# Patient Record
Sex: FEMALE | Race: WHITE | NOT HISPANIC OR LATINO | Employment: OTHER | URBAN - METROPOLITAN AREA
[De-identification: names, ages, dates, MRNs, and addresses within clinical notes are randomized per-mention and may not be internally consistent; named-entity substitution may affect disease eponyms.]

---

## 2021-11-06 ENCOUNTER — HOSPITAL ENCOUNTER (EMERGENCY)
Facility: HOSPITAL | Age: 76
Discharge: HOME | End: 2021-11-06
Attending: EMERGENCY MEDICINE | Admitting: EMERGENCY MEDICINE
Payer: MEDICARE

## 2021-11-06 VITALS
WEIGHT: 183 LBS | OXYGEN SATURATION: 98 % | TEMPERATURE: 97.4 F | DIASTOLIC BLOOD PRESSURE: 67 MMHG | RESPIRATION RATE: 20 BRPM | BODY MASS INDEX: 33.68 KG/M2 | HEIGHT: 62 IN | HEART RATE: 92 BPM | SYSTOLIC BLOOD PRESSURE: 122 MMHG
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
## 2021-11-06 DIAGNOSIS — L03.90 CELLULITIS: ICD-10-CM

## 2021-11-06 PROCEDURE — 99283 EMERGENCY DEPT VISIT LOW MDM: CPT

## 2021-11-06 PROCEDURE — 86618 LYME DISEASE ANTIBODY: CPT | Performed by: EMERGENCY MEDICINE

## 2021-11-06 PROCEDURE — 99283 EMERGENCY DEPT VISIT LOW MDM: CPT | Performed by: EMERGENCY MEDICINE

## 2021-11-06 PROCEDURE — 36415 COLL VENOUS BLD VENIPUNCTURE: CPT | Performed by: EMERGENCY MEDICINE

## 2021-11-06 RX ORDER — GINSENG 100 MG
1 CAPSULE ORAL ONCE
Status: COMPLETED | OUTPATIENT
Start: 2021-11-06 | End: 2021-11-06

## 2021-11-06 RX ORDER — DOXYCYCLINE HYCLATE 100 MG/1
100 CAPSULE ORAL 2 TIMES DAILY
Qty: 14 CAPSULE | Refills: 0 | Status: SHIPPED | OUTPATIENT
Start: 2021-11-06 | End: 2021-11-13

## 2021-11-06 RX ORDER — DOXYCYCLINE HYCLATE 100 MG/1
100 CAPSULE ORAL ONCE
Status: COMPLETED | OUTPATIENT
Start: 2021-11-06 | End: 2021-11-06

## 2021-11-06 RX ADMIN — BACITRACIN 1 SMALL APPLICATION: 500 OINTMENT TOPICAL at 23:30

## 2021-11-06 RX ADMIN — DOXYCYCLINE 100 MG: 100 CAPSULE ORAL at 23:29

## 2021-11-10 LAB — B BURGDOR IGG+IGM SER-ACNC: 29

## 2023-02-15 ENCOUNTER — OFFICE VISIT (OUTPATIENT)
Dept: OBGYN CLINIC | Facility: MEDICAL CENTER | Age: 78
End: 2023-02-15

## 2023-02-15 VITALS
SYSTOLIC BLOOD PRESSURE: 107 MMHG | HEART RATE: 88 BPM | WEIGHT: 189 LBS | HEIGHT: 62 IN | BODY MASS INDEX: 34.78 KG/M2 | DIASTOLIC BLOOD PRESSURE: 72 MMHG

## 2023-02-15 DIAGNOSIS — S46.912A STRAIN OF LEFT SHOULDER, INITIAL ENCOUNTER: Primary | ICD-10-CM

## 2023-02-15 RX ORDER — LOSARTAN POTASSIUM AND HYDROCHLOROTHIAZIDE 12.5; 5 MG/1; MG/1
1 TABLET ORAL DAILY
COMMUNITY

## 2023-02-15 RX ORDER — ATORVASTATIN CALCIUM 40 MG/1
40 TABLET, FILM COATED ORAL DAILY
COMMUNITY

## 2023-02-15 NOTE — PROGRESS NOTES
Ortho Sports Medicine Shoulder Visit     Assesment:   left shoulder RC strain    Plan:    Conservative treatment:    Ice to shoulder 1-2 times daily, for 20 minutes at a time  PT for ROM and strengthening to shoulder, rotator cuff, scapular stabilizers  Imaging: All imaging from today was reviewed by myself and explained to the patient  Injection:    No Injection planned at this time  Surgery:     No surgery is recommended at this point, continue with conservative treatment plan as noted  History of Present Illness: The patient is a 69 y o  referred to me by their primary care physician, seen in clinic for consultation of left shoulder pain  The patient denies a history of diabetes  The patient denies a history of thyroid disorder  Pain is located anterior, posterior, lateral   The patient rates the pain as a 5/10  The pain has been present for a few months     The mechanism of injury was unknown  The pain is characterized as dull, achy  The pain is present daily  Pain is improved by rest   Pain is aggravated by overhead activity  Symptoms include clicking  The patient denies weakness  The patient denies numbness and tingling  The patient has tried rest, ice and NSAIDS  Shoulder Surgical History:  None    Past Medical, Social and Family History:  Past Medical History:   Diagnosis Date   • High cholesterol    • Hypertension      History reviewed  No pertinent surgical history    Allergies   Allergen Reactions   • Bee Venom Anaphylaxis     Current Outpatient Medications on File Prior to Visit   Medication Sig Dispense Refill   • atorvastatin (LIPITOR) 40 mg tablet Take 40 mg by mouth daily     • Calcium Carbonate (CALCIUM 600 PO) Take by mouth     • Cetirizine HCl (ZYRTEC ALLERGY PO) Take by mouth     • losartan-hydrochlorothiazide (HYZAAR) 50-12 5 mg per tablet Take 1 tablet by mouth daily     • Multiple Vitamins-Minerals (CENTURY PO) Take by mouth No current facility-administered medications on file prior to visit  Social History     Socioeconomic History   • Marital status:      Spouse name: Not on file   • Number of children: Not on file   • Years of education: Not on file   • Highest education level: Not on file   Occupational History   • Not on file   Tobacco Use   • Smoking status: Every Day     Packs/day: 0 25     Types: Cigarettes   • Smokeless tobacco: Not on file   Vaping Use   • Vaping Use: Never used   Substance and Sexual Activity   • Alcohol use: Yes     Comment: social   • Drug use: Never   • Sexual activity: Not on file   Other Topics Concern   • Not on file   Social History Narrative   • Not on file     Social Determinants of Health     Financial Resource Strain: Not on file   Food Insecurity: Not on file   Transportation Needs: Not on file   Physical Activity: Not on file   Stress: Not on file   Social Connections: Not on file   Intimate Partner Violence: Not on file   Housing Stability: Not on file         I have reviewed the past medical, surgical, social and family history, medications and allergies as documented in the EMR  Review of systems: ROS is negative other than that noted in the HPI  Constitutional: Negative for fatigue and fever  HENT: Negative for sore throat  Respiratory: Negative for shortness of breath  Cardiovascular: Negative for chest pain  Gastrointestinal: Negative for abdominal pain  Endocrine: Negative for cold intolerance and heat intolerance  Genitourinary: Negative for flank pain  Musculoskeletal: Negative for back pain  Skin: Negative for rash  Allergic/Immunologic: Negative for immunocompromised state  Neurological: Negative for dizziness  Psychiatric/Behavioral: Negative for agitation  Physical Exam:    Blood pressure 107/72, pulse 88, height 5' 2" (1 575 m), weight 85 7 kg (189 lb)      General/Constitutional: NAD, well developed, well nourished  HENT: Normocephalic, atraumatic  CV: Intact distal pulses, regular rate  Resp: No respiratory distress or labored breathing  Lymphatic: No lymphadenopathy palpated  Neuro: Alert and Oriented x 3, no focal deficits  Psych: Normal mood, normal affect, normal judgement, normal behavior  Skin: Warm, dry, no rashes, no erythema     Shoulder Exam (focused): Shoulder focused exam:       RIGHT LEFT    Scapula Atrophy Negative Negative     Winging Negative Negative     Protraction Negative Negative    Rotator cuff SS 5/5 5/5     IS 5/5 5/5     SubS 5/5 5/5    ROM  170     ER0 60 60     ER90 90 90     IR90 40 40     IRb T6 T6    TTP: AC Negative Negative     Biceps Negative Negative     Coracoid Negative Negative    Special Tests: O'Briens Negative Negative     Nash-shear Negative Negative     Cross body Adduction Negative Negative     Speeds  Negative Negative     Ana's Negative Negative     Whipple Negative Negative       Neer Negative Negative     Dubois Negative Negative    Instability: Apprehension & relocation not tested not tested     Load & shift not tested not tested    Other: Crank Negative Negative               UE NV Exam: +2 Radial pulses bilaterally  Sensation intact to light touch C5-T1 bilaterally, Radial/median/ulnar nerve motor intact      Bilateral elbow, wrist, and and forearm ROM full, painless with passive ROM, no ttp or crepitance throughout extremities below shoulder joint    Cervical ROM is full without pain, numbness or tingling      Shoulder Imaging  MRI of the left shoulder were reviewed, which demonstrate small interstitial tearing of rotator cuff but no full-thickness tear  I have reviewed the radiology report and agree with their impression

## 2023-02-22 ENCOUNTER — TELEPHONE (OUTPATIENT)
Dept: OBGYN CLINIC | Facility: HOSPITAL | Age: 78
End: 2023-02-22

## 2023-02-22 NOTE — TELEPHONE ENCOUNTER
Caller: Wilder Philip - FRIEDA    Doctor/Office: beatrice    CB#: [phone]      What needs to be faxed: PT script    ATTN to: Wilder Philip    Fax#: [phone]      Documents were successfully e-faxed

## 2024-02-22 ENCOUNTER — HOSPITAL ENCOUNTER (OUTPATIENT)
Dept: MRI IMAGING | Facility: HOSPITAL | Age: 79
End: 2024-02-22
Payer: MEDICARE

## 2024-02-22 DIAGNOSIS — M25.561 PAIN IN RIGHT KNEE: ICD-10-CM

## 2024-02-22 PROCEDURE — 73721 MRI JNT OF LWR EXTRE W/O DYE: CPT

## 2024-02-22 PROCEDURE — G1004 CDSM NDSC: HCPCS

## 2025-03-03 LAB — HBA1C MFR BLD HPLC: 6.4 %

## 2025-06-19 ENCOUNTER — DOCUMENTATION (OUTPATIENT)
Dept: HEMATOLOGY ONCOLOGY | Facility: CLINIC | Age: 80
End: 2025-06-19

## 2025-06-19 ENCOUNTER — PATIENT OUTREACH (OUTPATIENT)
Dept: HEMATOLOGY ONCOLOGY | Facility: CLINIC | Age: 80
End: 2025-06-19

## 2025-06-19 ENCOUNTER — TELEPHONE (OUTPATIENT)
Age: 80
End: 2025-06-19

## 2025-06-19 NOTE — TELEPHONE ENCOUNTER
Call received by Vivi from  office- identifiers were provided.     Vivi called requesting to speak to care coordinator prior to placing referral. Vivi states patient had CT scan completed and wants to make sure patient is being referred to the correct department. Provider unsure if patient should be seen by Pulmonary or Hematology Oncology. Vivi is the care coordinator for  and is requesting a call back to further discuss.     Please call Vivi at [phone]

## 2025-06-19 NOTE — PROGRESS NOTES
Received the following inDerivixet message:    Call received by Vivi from  office- identifiers were provided.      Vivi called requesting to speak to care coordinator prior to placing referral. Vivi states patient had CT scan completed and wants to make sure patient is being referred to the correct department. Provider unsure if patient should be seen by Pulmonary or Hematology Oncology. Vivi is the care coordinator for  and is requesting a call back to further discuss.      Please call Vivi at [phone].    Called and spoke to Vivi.  Patient had CT chest on 6/18/25 at [facility].    IMPRESSION:  Complete left upper lobe atelectasis with obstruction of the proximal left upper lobe bronchus.  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 Trace left pleural effusion is also seen.     I recommended Vivi have Dr. Dixon place a referral to Dr. Oconnor in Thoracic Surgery and also informed her I would make the [facility] aware of the patient.  I provided my direct contact information for further questions or concerns.

## 2025-06-19 NOTE — PROGRESS NOTES
Message sent to Thoracic Surgery to retrieve records    [] Email sent to pt with Authorization for Release of Health Information  Pt referred to Thoracic Surgery and should be scheduled within  days.  Please notify me if not scheduled within time frame.    Referral received/ Chart reviewed for work up completed     Imaging completed:    [] PET/CT   [] MRI   [x] CT chest 06/18/25  [facility]   [] US   [] Mammo   [] Bone scan   [] N/A    Pathology completed:    Date:   Location:   []N/A    Additional records needed:    [] Genomic report   [] Genetic testing results   [] Office Note   [] Procedure/ Operative note   [] Lab results   [] N/A      [] Radiation Oncology records retrieval needed (PN to route to rad/onc clerical pool once scheduled)  Date:  Location:    Hx of skin cancer - excised   No hx of chemo or RT

## 2025-06-20 ENCOUNTER — PATIENT OUTREACH (OUTPATIENT)
Dept: HEMATOLOGY ONCOLOGY | Facility: CLINIC | Age: 80
End: 2025-06-20

## 2025-06-20 NOTE — PROGRESS NOTES
Outreach to patient regarding referral placed by PCP. Advised we will be getting her scheduled with Thoracic Surgery regarding CT chest. We will be sending her to [facility] on Wednesday 06/25/25 at 12.  If that changes someone will let her know.   States she also had PFT's done at [facility]. Patient verbalized understanding and will be bringing a friend with her.

## 2025-06-25 ENCOUNTER — CONSULT (OUTPATIENT)
Dept: CARDIAC SURGERY | Facility: HOSPITAL | Age: 80
End: 2025-06-25
Payer: MEDICARE

## 2025-06-25 ENCOUNTER — TELEPHONE (OUTPATIENT)
Dept: CARDIAC SURGERY | Facility: CLINIC | Age: 80
End: 2025-06-25

## 2025-06-25 VITALS
HEART RATE: 84 BPM | SYSTOLIC BLOOD PRESSURE: 128 MMHG | RESPIRATION RATE: 18 BRPM | TEMPERATURE: 97.2 F | OXYGEN SATURATION: 93 % | DIASTOLIC BLOOD PRESSURE: 74 MMHG | BODY MASS INDEX: 39.27 KG/M2 | HEIGHT: 60 IN | WEIGHT: 200 LBS

## 2025-06-25 DIAGNOSIS — J18.1 LEFT UPPER LOBE CONSOLIDATION (HCC): ICD-10-CM

## 2025-06-25 DIAGNOSIS — R59.0 MEDIASTINAL ADENOPATHY: Primary | ICD-10-CM

## 2025-06-25 DIAGNOSIS — J43.9 PULMONARY EMPHYSEMA, UNSPECIFIED EMPHYSEMA TYPE (HCC): ICD-10-CM

## 2025-06-25 PROCEDURE — 99205 OFFICE O/P NEW HI 60 MIN: CPT | Performed by: SURGERY

## 2025-06-25 RX ORDER — FLUTICASONE PROPIONATE AND SALMETEROL 100; 50 UG/1; UG/1
POWDER RESPIRATORY (INHALATION)
COMMUNITY

## 2025-06-25 NOTE — PROGRESS NOTES
Name: Adela Isidro      : 1945      MRN: [id number]  Encounter Provider: Gt Oconnor DO  Encounter Date: 2025   Encounter department: [facility]  :  Assessment & Plan  Mediastinal adenopathy  79yF w/ left upper lobe obstruction with segmental atelectasis. Also associated with mediastinal lymphadenopathy.    Will perform a bronchoscopy and EBUS with biopsies to establish a diagnosis.  Patient has stated she wants this done after  hol. Will work on scheduling. She was offered a date next week.     Obtained consent for bronchoscopy and EBUS with FNA. Discussed risks including bleeding and a sore throat from being on the ventilator.     Orders:  •  Case request operating room: ENDOBRONCHIAL ULTRASOUND (EBUS), BRONCHOSCOPY FLEXIBLE; Standing    Left upper lobe consolidation (HCC)  Segmental atelectasis. Suspect 2/2 endobronchial obstruction although no clear lesion on CT.   Proceeding with bronchoscopy and biopsies.        Pulmonary emphysema, unspecified emphysema type (HCC)  Stable.            Thoracic History     Problem   Pulmonary Emphysema, Unspecified Emphysema Type (Hcc)        History of Present Illness   HPI  Adela Isidro is a 79 y.o. female who presents for evaluation. Her medical conditions include HTN, obesity, GERD.    She states she has an intermittent wheeze and cough. The cough has been present since November. No fevers, chills, chest pain. No weight loss. She is former smoker having quit about 2 years ago.     Family history includes son with ALL and bladder cancer.         Review of Systems   Constitutional:  Negative for chills, fatigue and fever.   HENT:  Negative for trouble swallowing and voice change.    Eyes:  Negative for photophobia and visual disturbance.   Respiratory:  Positive for cough and wheezing. Negative for shortness of breath.    Cardiovascular:  Negative for chest pain and palpitations.   Gastrointestinal:   Negative for abdominal pain, nausea and vomiting.   Musculoskeletal:  Negative for back pain and gait problem.   Skin:  Negative for rash and wound.   Neurological:  Negative for dizziness, weakness, light-headedness and headaches.   All other systems reviewed and are negative.          Objective   /74 (BP Location: Right arm, Patient Position: Sitting, Cuff Size: Adult)   Pulse 84   Temp (!) 97.2 °F (36.2 °C) (Temporal)   Resp 18   Ht 5' (1.524 m)   Wt 90.7 kg (200 lb)   SpO2 93%   BMI 39.06 kg/m²     Pain Screening:  Pain Score: 0-No pain  ECOG    Physical Exam  Vitals reviewed.   Constitutional:       General: She is not in acute distress.     Appearance: Normal appearance.   HENT:      Head: Normocephalic and atraumatic.     Cardiovascular:      Rate and Rhythm: Normal rate and regular rhythm.      Pulses: Normal pulses.      Heart sounds: Normal heart sounds.   Pulmonary:      Effort: No respiratory distress.      Breath sounds: Normal breath sounds.   Abdominal:      General: Abdomen is flat. There is no distension.      Palpations: Abdomen is soft.     Musculoskeletal:      Cervical back: Normal range of motion.      Right lower leg: No edema.      Left lower leg: No edema.   Lymphadenopathy:      Cervical: No cervical adenopathy.     Neurological:      General: No focal deficit present.      Mental Status: She is alert and oriented to person, place, and time. Mental status is at baseline.     Psychiatric:         Mood and Affect: Mood normal.         Behavior: Behavior normal.         Thought Content: Thought content normal.         Judgment: Judgment normal.         Labs:       Pathology: I have reviewed pathology reports described above.

## 2025-06-25 NOTE — TELEPHONE ENCOUNTER
Made 3 attempts to call patient and unable to leave voice message. I will continue to call patient and schedule her biopsy with Dr. Oconnor.

## 2025-06-25 NOTE — TELEPHONE ENCOUNTER
Spoke with patient and she informed me that she would like to have the EBUS biopsy after Monday, 7/7/25. I did inform her that I will be working closely with the OR booking team and my surgical counterparts to see if I am able to get her scheduled sooner rather than later with Dr. Oconnor. I will keep patient informed on my next steps.

## 2025-06-25 NOTE — H&P (VIEW-ONLY)
Name: Adela Isidro      : 1945      MRN: [id number]  Encounter Provider: Gt Oconnor DO  Encounter Date: 2025   Encounter department: [facility]  :  Assessment & Plan  Mediastinal adenopathy  79yF w/ left upper lobe obstruction with segmental atelectasis. Also associated with mediastinal lymphadenopathy.    Will perform a bronchoscopy and EBUS with biopsies to establish a diagnosis.  Patient has stated she wants this done after  hol. Will work on scheduling. She was offered a date next week.     Obtained consent for bronchoscopy and EBUS with FNA. Discussed risks including bleeding and a sore throat from being on the ventilator.     Orders:  •  Case request operating room: ENDOBRONCHIAL ULTRASOUND (EBUS), BRONCHOSCOPY FLEXIBLE; Standing    Left upper lobe consolidation (HCC)  Segmental atelectasis. Suspect 2/2 endobronchial obstruction although no clear lesion on CT.   Proceeding with bronchoscopy and biopsies.        Pulmonary emphysema, unspecified emphysema type (HCC)  Stable.            Thoracic History     Problem   Pulmonary Emphysema, Unspecified Emphysema Type (Hcc)        History of Present Illness   HPI  Adela Isidro is a 79 y.o. female who presents for evaluation. Her medical conditions include HTN, obesity, GERD.    She states she has an intermittent wheeze and cough. The cough has been present since November. No fevers, chills, chest pain. No weight loss. She is former smoker having quit about 2 years ago.     Family history includes son with ALL and bladder cancer.         Review of Systems   Constitutional:  Negative for chills, fatigue and fever.   HENT:  Negative for trouble swallowing and voice change.    Eyes:  Negative for photophobia and visual disturbance.   Respiratory:  Positive for cough and wheezing. Negative for shortness of breath.    Cardiovascular:  Negative for chest pain and palpitations.   Gastrointestinal:   Negative for abdominal pain, nausea and vomiting.   Musculoskeletal:  Negative for back pain and gait problem.   Skin:  Negative for rash and wound.   Neurological:  Negative for dizziness, weakness, light-headedness and headaches.   All other systems reviewed and are negative.          Objective   /74 (BP Location: Right arm, Patient Position: Sitting, Cuff Size: Adult)   Pulse 84   Temp (!) 97.2 °F (36.2 °C) (Temporal)   Resp 18   Ht 5' (1.524 m)   Wt 90.7 kg (200 lb)   SpO2 93%   BMI 39.06 kg/m²     Pain Screening:  Pain Score: 0-No pain  ECOG    Physical Exam  Vitals reviewed.   Constitutional:       General: She is not in acute distress.     Appearance: Normal appearance.   HENT:      Head: Normocephalic and atraumatic.     Cardiovascular:      Rate and Rhythm: Normal rate and regular rhythm.      Pulses: Normal pulses.      Heart sounds: Normal heart sounds.   Pulmonary:      Effort: No respiratory distress.      Breath sounds: Normal breath sounds.   Abdominal:      General: Abdomen is flat. There is no distension.      Palpations: Abdomen is soft.     Musculoskeletal:      Cervical back: Normal range of motion.      Right lower leg: No edema.      Left lower leg: No edema.   Lymphadenopathy:      Cervical: No cervical adenopathy.     Neurological:      General: No focal deficit present.      Mental Status: She is alert and oriented to person, place, and time. Mental status is at baseline.     Psychiatric:         Mood and Affect: Mood normal.         Behavior: Behavior normal.         Thought Content: Thought content normal.         Judgment: Judgment normal.         Labs:       Pathology: I have reviewed pathology reports described above.

## 2025-07-02 ENCOUNTER — TELEPHONE (OUTPATIENT)
Dept: CARDIAC SURGERY | Facility: CLINIC | Age: 80
End: 2025-07-02

## 2025-07-02 NOTE — TELEPHONE ENCOUNTER
Spoke with patient to inform her that I am still working on finding time for Dr. Oconnor to perform her EBUS biopsy. Patient prefers to have the biopsy after Molnday, 7/7/25. I will call patient when OR time opens up within the week of 7/7/25 or early week after. Patient verbalized her understanding and will await for my phone call.

## 2025-07-07 ENCOUNTER — TELEPHONE (OUTPATIENT)
Dept: CARDIAC SURGERY | Facility: CLINIC | Age: 80
End: 2025-07-07

## 2025-07-07 NOTE — TELEPHONE ENCOUNTER
Spoke with patient and solidified a surgical date for her EBUS biopsy with Dr. Oconnor. Patient is scheduled for Friday, 7/11/25. Post op appt will not be necessary. Thoracic provider will call patient with results and provide next steps. Patient requested EBUS biopsy be scheduled after 7/7/25. Patient was very pleasant and further discussed the surgical process. Patient verbalized her understanding on next steps.    Secondary Intention Text (Leave Blank If You Do Not Want): The defect will heal with secondary intention.

## 2025-07-08 ENCOUNTER — ANESTHESIA EVENT (OUTPATIENT)
Dept: PERIOP | Facility: HOSPITAL | Age: 80
End: 2025-07-08
Payer: MEDICARE

## 2025-07-08 RX ORDER — FLUTICASONE FUROATE AND VILANTEROL 100; 25 UG/1; UG/1
1 POWDER RESPIRATORY (INHALATION) DAILY
COMMUNITY
End: 2025-07-22

## 2025-07-08 RX ORDER — FLUTICASONE PROPIONATE AND SALMETEROL 250; 50 UG/1; UG/1
1 POWDER RESPIRATORY (INHALATION) 2 TIMES DAILY
COMMUNITY

## 2025-07-08 NOTE — PRE-PROCEDURE INSTRUCTIONS
Pre-Surgery Instructions:   Medication Instructions    atorvastatin (LIPITOR) 40 mg tablet Take night before surgery    Calcium Carbonate (CALCIUM 600 PO) Last dose 7/7    Cetirizine HCl (ZYRTEC ALLERGY PO) Uses PRN- OK to take day of surgery    Fluticasone-Salmeterol (Advair) 250-50 mcg/dose inhaler Take day of surgery.    losartan-hydrochlorothiazide (HYZAAR) 50-12.5 mg per tablet Hold day of surgery.    Multiple Vitamins-Minerals (CENTURY PO) Last dose 7/8     Spoke with pt via phone.    Medication instructions for day of surgery reviewed. Patient verbalized understanding and agrees with the plan.  Please take all instructed medications with only a sip of water. Please do not take any over the counter (non-prescribed) vitamins or supplements for one week prior to date of surgery.      You will receive a call one business day prior to surgery with an arrival time and hospital directions. If your surgery is scheduled on a Monday, the hospital will be calling you on the Friday prior to your surgery. If you have not heard from anyone by 8pm, please call the hospital supervisor through the hospital  at [phone]. ([place] [phone] or [place] [phone]).    Do not eat or drink anything after midnight the night before your surgery, including candy, mints, lifesavers, or chewing gum. Do not drink alcohol 24hrs before your surgery. Try not to smoke at least 24hrs before your surgery.       Follow the pre surgery showering instructions as listed in the “My Surgical Experience Booklet” or otherwise provided by your surgeon's office. Do not use a blade to shave the surgical area 1 week before surgery. It is okay to use a clean electric clippers up to 24 hours before surgery. Do not apply any lotions, creams, including makeup, cologne, deodorant, or perfumes after showering on the day of your surgery. Do not use dry shampoo, hair spray, hair gel, or any type of hair products.     No contact lenses, eye  make-up, or artificial eyelashes. Remove nail polish, including gel polish, and any artificial, gel, or acrylic nails if possible. Remove all jewelry including rings and body piercing jewelry.     Wear causal clothing that is easy to take on and off. Consider your type of surgery.    Keep any valuables, jewelry, piercings at home. Please bring any specially ordered equipment (sling, braces) if indicated.    Arrange for a responsible person to drive you to and from the hospital on the day of your surgery. Please confirm the visitor policy for the day of your procedure when you receive your phone call with an arrival time.     Call the surgeon's office with any new illnesses, exposures, or additional questions prior to surgery.    Please reference your “My Surgical Experience Booklet” for additional information to prepare for your upcoming surgery.

## 2025-07-11 ENCOUNTER — HOSPITAL ENCOUNTER (OUTPATIENT)
Facility: HOSPITAL | Age: 80
Setting detail: OUTPATIENT SURGERY
Discharge: HOME | End: 2025-07-11
Attending: SURGERY | Admitting: SURGERY
Payer: MEDICARE

## 2025-07-11 ENCOUNTER — ANESTHESIA (OUTPATIENT)
Dept: PERIOP | Facility: HOSPITAL | Age: 80
End: 2025-07-11
Payer: MEDICARE

## 2025-07-11 VITALS
DIASTOLIC BLOOD PRESSURE: 93 MMHG | BODY MASS INDEX: 38.68 KG/M2 | TEMPERATURE: 96 F | WEIGHT: 197 LBS | HEART RATE: 81 BPM | RESPIRATION RATE: 18 BRPM | OXYGEN SATURATION: 91 % | SYSTOLIC BLOOD PRESSURE: 129 MMHG | HEIGHT: 60 IN

## 2025-07-11 DIAGNOSIS — R59.0 MEDIASTINAL ADENOPATHY: ICD-10-CM

## 2025-07-11 PROBLEM — M19.90 OSTEOARTHRITIS: Status: ACTIVE | Noted: 2025-07-11

## 2025-07-11 PROBLEM — I10 HTN (HYPERTENSION): Status: ACTIVE | Noted: 2025-07-11

## 2025-07-11 PROBLEM — K21.9 GASTROESOPHAGEAL REFLUX DISEASE: Status: ACTIVE | Noted: 2025-07-11

## 2025-07-11 PROCEDURE — 88112 CYTOPATH CELL ENHANCE TECH: CPT | Performed by: PATHOLOGY

## 2025-07-11 PROCEDURE — 88341 IMHCHEM/IMCYTCHM EA ADD ANTB: CPT | Performed by: PATHOLOGY

## 2025-07-11 PROCEDURE — 31623 DX BRONCHOSCOPE/BRUSH: CPT | Performed by: SURGERY

## 2025-07-11 PROCEDURE — 88333 PATH CONSLTJ SURG CYTO XM 1: CPT | Performed by: PATHOLOGY

## 2025-07-11 PROCEDURE — 31652 BRONCH EBUS SAMPLNG 1/2 NODE: CPT | Performed by: SURGERY

## 2025-07-11 PROCEDURE — 88173 CYTOPATH EVAL FNA REPORT: CPT | Performed by: PATHOLOGY

## 2025-07-11 PROCEDURE — 88185 FLOWCYTOMETRY/TC ADD-ON: CPT | Performed by: SURGERY

## 2025-07-11 PROCEDURE — 31625 BRONCHOSCOPY W/BIOPSY(S): CPT | Performed by: SURGERY

## 2025-07-11 PROCEDURE — 88305 TISSUE EXAM BY PATHOLOGIST: CPT | Performed by: PATHOLOGY

## 2025-07-11 PROCEDURE — 88360 TUMOR IMMUNOHISTOCHEM/MANUAL: CPT | Performed by: PATHOLOGY

## 2025-07-11 PROCEDURE — 88184 FLOWCYTOMETRY/ TC 1 MARKER: CPT | Performed by: SURGERY

## 2025-07-11 PROCEDURE — 88172 CYTP DX EVAL FNA 1ST EA SITE: CPT | Performed by: PATHOLOGY

## 2025-07-11 PROCEDURE — 88342 IMHCHEM/IMCYTCHM 1ST ANTB: CPT | Performed by: PATHOLOGY

## 2025-07-11 RX ORDER — MEPERIDINE HYDROCHLORIDE 25 MG/ML
12.5 INJECTION INTRAMUSCULAR; INTRAVENOUS; SUBCUTANEOUS
Status: DISCONTINUED | OUTPATIENT
Start: 2025-07-11 | End: 2025-07-11 | Stop reason: HOSPADM

## 2025-07-11 RX ORDER — ALBUTEROL SULFATE 0.83 MG/ML
2.5 SOLUTION RESPIRATORY (INHALATION) ONCE
Status: DISCONTINUED | OUTPATIENT
Start: 2025-07-11 | End: 2025-07-11 | Stop reason: HOSPADM

## 2025-07-11 RX ORDER — ROCURONIUM BROMIDE 10 MG/ML
INJECTION, SOLUTION INTRAVENOUS AS NEEDED
Status: DISCONTINUED | OUTPATIENT
Start: 2025-07-11 | End: 2025-07-11

## 2025-07-11 RX ORDER — MIDAZOLAM HYDROCHLORIDE 2 MG/2ML
INJECTION, SOLUTION INTRAMUSCULAR; INTRAVENOUS AS NEEDED
Status: DISCONTINUED | OUTPATIENT
Start: 2025-07-11 | End: 2025-07-11

## 2025-07-11 RX ORDER — PHENYLEPHRINE HCL IN 0.9% NACL 1 MG/10 ML
SYRINGE (ML) INTRAVENOUS AS NEEDED
Status: DISCONTINUED | OUTPATIENT
Start: 2025-07-11 | End: 2025-07-11

## 2025-07-11 RX ORDER — DEXAMETHASONE SODIUM PHOSPHATE 10 MG/ML
INJECTION, SOLUTION INTRAMUSCULAR; INTRAVENOUS AS NEEDED
Status: DISCONTINUED | OUTPATIENT
Start: 2025-07-11 | End: 2025-07-11

## 2025-07-11 RX ORDER — SODIUM CHLORIDE, SODIUM LACTATE, POTASSIUM CHLORIDE, CALCIUM CHLORIDE 600; 310; 30; 20 MG/100ML; MG/100ML; MG/100ML; MG/100ML
INJECTION, SOLUTION INTRAVENOUS CONTINUOUS PRN
Status: DISCONTINUED | OUTPATIENT
Start: 2025-07-11 | End: 2025-07-11

## 2025-07-11 RX ORDER — PROMETHAZINE HYDROCHLORIDE 25 MG/ML
25 INJECTION, SOLUTION INTRAMUSCULAR; INTRAVENOUS ONCE AS NEEDED
Status: DISCONTINUED | OUTPATIENT
Start: 2025-07-11 | End: 2025-07-11 | Stop reason: HOSPADM

## 2025-07-11 RX ORDER — ONDANSETRON 2 MG/ML
4 INJECTION INTRAMUSCULAR; INTRAVENOUS ONCE AS NEEDED
Status: DISCONTINUED | OUTPATIENT
Start: 2025-07-11 | End: 2025-07-11 | Stop reason: HOSPADM

## 2025-07-11 RX ORDER — ONDANSETRON 2 MG/ML
INJECTION INTRAMUSCULAR; INTRAVENOUS AS NEEDED
Status: DISCONTINUED | OUTPATIENT
Start: 2025-07-11 | End: 2025-07-11

## 2025-07-11 RX ORDER — LIDOCAINE HYDROCHLORIDE 10 MG/ML
INJECTION, SOLUTION EPIDURAL; INFILTRATION; INTRACAUDAL; PERINEURAL AS NEEDED
Status: DISCONTINUED | OUTPATIENT
Start: 2025-07-11 | End: 2025-07-11

## 2025-07-11 RX ORDER — EPINEPHRINE 1 MG/ML
INJECTION, SOLUTION, CONCENTRATE INTRAVENOUS AS NEEDED
Status: DISCONTINUED | OUTPATIENT
Start: 2025-07-11 | End: 2025-07-11 | Stop reason: HOSPADM

## 2025-07-11 RX ORDER — GLYCOPYRROLATE 0.2 MG/ML
INJECTION INTRAMUSCULAR; INTRAVENOUS AS NEEDED
Status: DISCONTINUED | OUTPATIENT
Start: 2025-07-11 | End: 2025-07-11

## 2025-07-11 RX ORDER — PROPOFOL 10 MG/ML
INJECTION, EMULSION INTRAVENOUS AS NEEDED
Status: DISCONTINUED | OUTPATIENT
Start: 2025-07-11 | End: 2025-07-11

## 2025-07-11 RX ORDER — FENTANYL CITRATE 50 UG/ML
INJECTION, SOLUTION INTRAMUSCULAR; INTRAVENOUS AS NEEDED
Status: DISCONTINUED | OUTPATIENT
Start: 2025-07-11 | End: 2025-07-11

## 2025-07-11 RX ORDER — HYDROMORPHONE HCL/PF 1 MG/ML
0.5 SYRINGE (ML) INJECTION
Status: DISCONTINUED | OUTPATIENT
Start: 2025-07-11 | End: 2025-07-11 | Stop reason: HOSPADM

## 2025-07-11 RX ORDER — SODIUM CHLORIDE, SODIUM LACTATE, POTASSIUM CHLORIDE, CALCIUM CHLORIDE 600; 310; 30; 20 MG/100ML; MG/100ML; MG/100ML; MG/100ML
125 INJECTION, SOLUTION INTRAVENOUS CONTINUOUS
Status: DISCONTINUED | OUTPATIENT
Start: 2025-07-11 | End: 2025-07-11 | Stop reason: HOSPADM

## 2025-07-11 RX ORDER — FENTANYL CITRATE/PF 50 MCG/ML
25 SYRINGE (ML) INJECTION
Status: DISCONTINUED | OUTPATIENT
Start: 2025-07-11 | End: 2025-07-11 | Stop reason: HOSPADM

## 2025-07-11 RX ADMIN — ROCURONIUM BROMIDE 50 MG: 10 INJECTION, SOLUTION INTRAVENOUS at 12:21

## 2025-07-11 RX ADMIN — LIDOCAINE HYDROCHLORIDE 100 MG: 10 INJECTION, SOLUTION EPIDURAL; INFILTRATION; INTRACAUDAL; PERINEURAL at 12:21

## 2025-07-11 RX ADMIN — SODIUM CHLORIDE, SODIUM LACTATE, POTASSIUM CHLORIDE, AND CALCIUM CHLORIDE: .6; .31; .03; .02 INJECTION, SOLUTION INTRAVENOUS at 13:40

## 2025-07-11 RX ADMIN — Medication 100 MCG: at 13:25

## 2025-07-11 RX ADMIN — PROPOFOL 100 MCG/KG/MIN: 10 INJECTION, EMULSION INTRAVENOUS at 12:25

## 2025-07-11 RX ADMIN — DEXAMETHASONE SODIUM PHOSPHATE 10 MG: 10 INJECTION, SOLUTION INTRAMUSCULAR; INTRAVENOUS at 12:25

## 2025-07-11 RX ADMIN — MIDAZOLAM 2 MG: 1 INJECTION INTRAMUSCULAR; INTRAVENOUS at 12:08

## 2025-07-11 RX ADMIN — SODIUM CHLORIDE, SODIUM LACTATE, POTASSIUM CHLORIDE, AND CALCIUM CHLORIDE: .6; .31; .03; .02 INJECTION, SOLUTION INTRAVENOUS at 12:03

## 2025-07-11 RX ADMIN — SUGAMMADEX 400 MG: 100 INJECTION, SOLUTION INTRAVENOUS at 13:44

## 2025-07-11 RX ADMIN — FENTANYL CITRATE 100 MCG: 50 INJECTION INTRAMUSCULAR; INTRAVENOUS at 12:21

## 2025-07-11 RX ADMIN — DEXMEDETOMIDINE HYDROCHLORIDE 4 MCG: 100 INJECTION, SOLUTION INTRAVENOUS at 13:04

## 2025-07-11 RX ADMIN — ROCURONIUM BROMIDE 10 MG: 10 INJECTION, SOLUTION INTRAVENOUS at 13:35

## 2025-07-11 RX ADMIN — SODIUM CHLORIDE, SODIUM LACTATE, POTASSIUM CHLORIDE, AND CALCIUM CHLORIDE 125 ML/HR: .6; .31; .03; .02 INJECTION, SOLUTION INTRAVENOUS at 10:13

## 2025-07-11 RX ADMIN — ONDANSETRON 4 MG: 2 INJECTION INTRAMUSCULAR; INTRAVENOUS at 12:25

## 2025-07-11 RX ADMIN — GLYCOPYRROLATE 0.1 MG: 0.2 INJECTION, SOLUTION INTRAMUSCULAR; INTRAVENOUS at 13:13

## 2025-07-11 RX ADMIN — PROPOFOL 150 MG: 10 INJECTION, EMULSION INTRAVENOUS at 12:21

## 2025-07-11 NOTE — OP NOTE
OPERATIVE REPORT  PATIENT NAME: Adela Isidro    :  1945  MRN: [id number]  Pt Location: BE OR ROOM 03    SURGERY DATE: 2025    Surgeons and Role:     * Gt Oconnor,  - Primary    Preop Diagnosis:  Mediastinal adenopathy [R59.0]    Post-Op Diagnosis Codes:     * Mediastinal adenopathy [R59.0]    Procedure(s):  Bronchoscopy  EBUS with transbronchial biopsies of 2 levels  Endobronchial biopsy  Endobronchial brushing    Specimen(s):  ID Type Source Tests Collected by Time Destination   1 : SANDY Mass Tissue Lung, Left Upper Lobe TISSUE EXAM Gt Oconnor, DO 2025 1237    2 : Level 4R FNA Lymph Node FINE NEEDLE ASPIRATION/BIOPSY Gt Oconnor, DO 2025 1241    3 : level 11L FNA Lymph Node FINE NEEDLE ASPIRATION/BIOPSY Gt Oconnor, DO 2025 1305    4 : left upper lobe brushing Other Lung PULMONARY CYTOLOGY Gt Oconnor, DO 2025 1322    A : Level 4R FNA Tissue Lymph Node LEUKEMIA/LYMPHOMA FLOW CYTOMETRY Gt Oconnor, DO 2025 1256    B : level 11L FNA Tissue Lymph Node LEUKEMIA/LYMPHOMA FLOW CYTOMETRY Gt Oconnor, DO 2025 1332        Estimated Blood Loss:   Minimal    Drains:  * No LDAs found *    Anesthesia Type:   General    Operative Indications:  Mediastinal adenopathy [R59.0]      Operative Findings:  Level 4R - Very large node correlating with imaging. RUPINDER negative for malignancy. Large bloody pellet sent for cell block and in RPMI for Flow.    Level 11L - two connected lymph nodes approached through left lower lobe orifice. Adjacent to the obstruction occurring in the SANDY orifice.  Prelim is negative for malignancy. Bloody pellet sent for cell block and in RPMI for Flow.           Yodit normal      LC2 with left upper lobe orifice obstruction      Closer view of LC2. Mostly external compression with some endobronchial inflammation and abnormality      Left upper lobe orifice         Complications:    None    Procedure and Technique:    The patient was brought to the operating room and placed in the supine position. After institution of general anesthesia utilizing a single lumen ETT, the fiberoptic bronchoscope was inserted.  The ETT was placed high and secured under visualization. The trachea appears normal. The latoya is normal.  The right sided airways were normal to the subsegmental level. On the left there is occlusion of the left upper lobe orifice. I was able to get the scope just into the left upper lobe orifice. There were mucosal abnormalities but mostly this is external compression. This was better visualized not in the pictures above but by placing the scope closer to the upper lobe and lingular latoya division. The left lower lobe was patent and normal but with some angular distortion from external compression.     I then performed an endobronchial biopsy of the left upper lobe. This was followed by two brushings (Sent together). The airway was very bloody after the endobronchial biopsy and I did not perform any further endobronchial biopsies.  Cold saline and 6 ml of diluted epinephrine would be used.     I then performed the EBUS. The large 4R node was easily seen. Several biopsies were performed. This was a soft bloody node. It was sent for RUPINDER which was negative for malignancy. A large pellet was sent in cytolyte and additional passes were sent in RPMI for Flow Cytometry. There was a small few mm in size level 7 that was not biopsied. There was a small <5mm 4L node that was not biopsied. I then placed the scope in the left lower lobe airway. Here I could see two lymph nodes adjacent to the left upper lobe occlusion. These lymph nodes were essentially one you mass. This was biopsied and sent for RUPINDER, cell block and in RPMI. Preliminary analysis was benign. There were no other nodes >4-5mm to biopsy. I could not place the scope in the upper lobe orifice to fully assess from that direction.  The PA was immediately visible in close proximity to the airway when the scope was seated by the orifice.     At the completion of the endobronchial ultrasound, a standard bronchoscope was reinserted and the airways were suctioned clear.  There was no sign of ongoing bleeding.  The bronchoscope was removed, the patient was extubated, and brought to the recovery unit in stable condition having tolerated the procedure well.  Sponge and instrument counts were correct.    I was present for the entire procedure.    Patient Disposition:  PACU          SIGNATURE: Gt Oconnor DO  DATE: July 11, 2025  TIME: 1:49 PM

## 2025-07-11 NOTE — INTERVAL H&P NOTE
H&P reviewed. After examining the patient I find no changes in the patients condition since the H&P had been written.    OR for bronchoscopy and EBUS

## 2025-07-11 NOTE — ANESTHESIA POSTPROCEDURE EVALUATION
Post-Op Assessment Note    CV Status:  Stable    Pain management: adequate       Mental Status:  Awake   Hydration Status:  Stable   PONV Controlled:  None   Airway Patency:  Patent     Post Op Vitals Reviewed: Yes    No anethesia notable event occurred.    Staff: Anesthesiologist           Last Filed PACU Vitals:  Vitals Value Taken Time   Temp 96.9 °F (36.1 °C) 07/11/25 14:45   Pulse 81 07/11/25 14:59   /81 07/11/25 14:54   Resp 21 07/11/25 14:59   SpO2 90 % 07/11/25 14:59   Vitals shown include unfiled device data.    Modified Karol:     Vitals Value Taken Time   Activity 2 07/11/25 14:45   Respiration 2 07/11/25 14:45   Circulation 2 07/11/25 14:45   Consciousness 2 07/11/25 14:45   Oxygen Saturation 2 07/11/25 14:45     Modified Karol Score: 10

## 2025-07-11 NOTE — ANESTHESIA POSTPROCEDURE EVALUATION
Post-Op Assessment Note    CV Status:  Stable  Pain Score: 4 (throat pain)    Pain management: adequate       Mental Status:  Alert and awake   Hydration Status:  Euvolemic   PONV Controlled:  Controlled   Airway Patency:  Patent  Two or more mitigation strategies used for obstructive sleep apnea   Post Op Vitals Reviewed: Yes    No anethesia notable event occurred.    Staff: CRNA, Anesthesiologist           Last Filed PACU Vitals:  Vitals Value Taken Time   Temp 96.9 °F (36.1 °C) 07/11/25 13:53   Pulse 84 07/11/25 13:54   /75 07/11/25 13:54   Resp 18 07/11/25 13:54   SpO2 99    Vitals shown include unfiled device data.

## 2025-07-11 NOTE — ANESTHESIA PREPROCEDURE EVALUATION
Procedure:  ENDOBRONCHIAL ULTRASOUND (EBUS) ([facility])  BRONCHOSCOPY FLEXIBLE ([facility])    Relevant Problems   ANESTHESIA (within normal limits)      CARDIO   (+) HTN (hypertension)   (-) Chest pain   (-) MI (myocardial infarction) (HCC)      ENDO  A1C 6.4      GI/HEPATIC  Obesity  BMI 38.5   (+) Gastroesophageal reflux disease      /RENAL (within normal limits)      HEMATOLOGY (within normal limits)      MUSCULOSKELETAL  Right knee replacement   (+) Osteoarthritis      PULMONARY  Left upper lobe obstruction  Mediastinal lymphadenopathy  Quit smoking 2 years ago   (+) Pulmonary emphysema, unspecified emphysema type (HCC)        Physical Exam    Airway     Mallampati score: II  TM Distance: >3 FB  Neck ROM: full      Cardiovascular  Cardiovascular exam normal    Dental   Comment: Upper edentulous, lower partial denture     Pulmonary      Neurological    She appears oriented x3.      Other Findings  post-pubertal.      Anesthesia Plan  ASA Score- 2     Anesthesia Type- general with ASA Monitors.         Additional Monitors:     Airway Plan: Oral ETT.           Plan Factors-Exercise tolerance (METS): >4 METS.    Chart reviewed. EKG reviewed.  Existing labs reviewed. Patient summary reviewed.    Patient is not a current smoker.              Induction-     Postoperative Plan- .   Monitoring Plan - Monitoring plan - standard ASA monitoring  Post Operative Pain Plan - multimodal analgesia        Informed Consent- Anesthetic plan and risks discussed with patient.  I personally reviewed this patient with the CRNA. Discussed and agreed on the Anesthesia Plan with the CRNA..      NPO Status:  Vitals Value Taken Time   Date of last liquid 07/10/25 07/11/25 09:54   Time of last liquid 2230 07/11/25 09:54   Date of last solid 07/10/25 07/11/25 09:54   Time of last solid 2200 07/11/25 09:54

## 2025-07-11 NOTE — DISCHARGE INSTR - AVS FIRST PAGE
"EBUS (Endobronchial Ultrasound) with Biopsies / Bronchoscopy     Today you had a bronchoscopy (inspection of the airway) with EBUS (endobronchial ultrasound) and biopsies.     Activity  - There are no restrictions after your procedure. You should resume your normal level of activity.     Pain:  - Pain is not expected after your procedure. There may be some soreness in your throat after being on the \"breathing machine\" (ventilator). This will improve with time. Soups and soft food may be more comfortable for the day after your procedure.     Medications:  - Continue on your home medications including any blood thinner and aspirin, as instructed.     Diet:  - You should continue on your normal diet. Soups and soft food may be more comfortable for the day after your procedure.     Follow-up:  -  Your pathology will be reported to you through your [employer] [facility]. This means that you will have the opportunity to see it before I am able to go through it with you and help make sense of it. We can discuss these results together in the office or over the phone.    Concerns:  - A small amount of blood with cough can be normal after a bronchoscopy with a biopsy. If it is persistent, large volume or concerning reach out to our office or go to the Emergency Department. If there are any other concerning symptoms such as shortness of breath, difficulty breathing, or chest pain also, please call or go to the Emergency Department.   - Call the office for any other questions or concerns. Many issues can be sorted out over the phone.       Thoracic Surgery Office: [phone]    Dr. William Burfeind, MD Rachael Hart, PA-C Dr. Meredith Harrison, MD Amylyn Mortimer, PA-C Dr. Dustin Manchester, MD Dr. Stephen Dingley, DO   "

## 2025-07-14 LAB
SCAN RESULT: NORMAL
SCAN RESULT: NORMAL

## 2025-07-15 ENCOUNTER — DOCUMENTATION (OUTPATIENT)
Dept: HEMATOLOGY ONCOLOGY | Facility: CLINIC | Age: 80
End: 2025-07-15

## 2025-07-15 ENCOUNTER — PATIENT OUTREACH (OUTPATIENT)
Dept: HEMATOLOGY ONCOLOGY | Facility: CLINIC | Age: 80
End: 2025-07-15

## 2025-07-15 ENCOUNTER — TELEPHONE (OUTPATIENT)
Dept: CARDIAC SURGERY | Facility: CLINIC | Age: 80
End: 2025-07-15

## 2025-07-15 DIAGNOSIS — C34.12 SMALL CELL CARCINOMA OF UPPER LOBE OF LEFT LUNG (HCC): Primary | ICD-10-CM

## 2025-07-15 PROCEDURE — 88333 PATH CONSLTJ SURG CYTO XM 1: CPT | Performed by: PATHOLOGY

## 2025-07-15 PROCEDURE — 88305 TISSUE EXAM BY PATHOLOGIST: CPT | Performed by: PATHOLOGY

## 2025-07-15 PROCEDURE — 88172 CYTP DX EVAL FNA 1ST EA SITE: CPT | Performed by: PATHOLOGY

## 2025-07-15 PROCEDURE — 88173 CYTOPATH EVAL FNA REPORT: CPT | Performed by: PATHOLOGY

## 2025-07-15 PROCEDURE — 88342 IMHCHEM/IMCYTCHM 1ST ANTB: CPT | Performed by: PATHOLOGY

## 2025-07-15 PROCEDURE — 88341 IMHCHEM/IMCYTCHM EA ADD ANTB: CPT | Performed by: PATHOLOGY

## 2025-07-15 PROCEDURE — 88112 CYTOPATH CELL ENHANCE TECH: CPT | Performed by: PATHOLOGY

## 2025-07-15 PROCEDURE — 88360 TUMOR IMMUNOHISTOCHEM/MANUAL: CPT | Performed by: PATHOLOGY

## 2025-07-16 ENCOUNTER — PATIENT OUTREACH (OUTPATIENT)
Dept: HEMATOLOGY ONCOLOGY | Facility: CLINIC | Age: 80
End: 2025-07-16

## 2025-07-16 ENCOUNTER — TELEPHONE (OUTPATIENT)
Age: 80
End: 2025-07-16

## 2025-07-16 DIAGNOSIS — R59.0 MEDIASTINAL ADENOPATHY: Primary | ICD-10-CM

## 2025-07-16 NOTE — PROGRESS NOTES
Called patient to see if she would agree to having PET CT and MRI brain on same day if possible. States she would have to check with Peyton to see if she is available.   States she will call back in the next hour or so. Verbalized understanding.     Patient states she has appointments scheduled.  Advised she needs a sooner appointment with medical oncology. She will be receiving a call from leadership department to move up appointment. They will likely move up MRI when she is seen by medical oncology. Verbalized understanding.

## 2025-07-16 NOTE — TELEPHONE ENCOUNTER
I called Adela in response to a referral that was received for patient to establish care with Medical Oncology    Outreach was made to schedule a consultation.    A consultation was scheduled for patient during this call. Patient is scheduled on 7/29/25 at 9:00 AM with Dr Poole at the [facility]  Has the patient been seen inpatient or outpatient ? (Within in the last 3 years) No If so when, N/A    Schedule within: 3 days  Schedule with: First available physician in a location [facility]    Pt is scheduled for first available appt at requested location. Pt is also scheduled for PET 7/24/25 and MRI 8/13/25.   Please advise if this appt can be moved sooner to meet urgent scheduling guidelines per NN review.

## 2025-07-17 ENCOUNTER — PATIENT OUTREACH (OUTPATIENT)
Dept: HEMATOLOGY ONCOLOGY | Facility: CLINIC | Age: 80
End: 2025-07-17

## 2025-07-17 ENCOUNTER — DOCUMENTATION (OUTPATIENT)
Dept: HEMATOLOGY ONCOLOGY | Facility: CLINIC | Age: 80
End: 2025-07-17

## 2025-07-17 ENCOUNTER — TELEPHONE (OUTPATIENT)
Age: 80
End: 2025-07-17

## 2025-07-17 NOTE — TELEPHONE ENCOUNTER
Called patient to offer a sooner appt with Dr Poole on 7/22 11 am and patient accepted appt.  Patient also had a lot of concern and questions regarding the PET scan scheduled and the injection for the test.  Advised patient I could not answer questions regarding clinical questions so would reach out to have someone on the clinical team reach out to discuss her concerns.  Patient provided a cell number of [phone] if she can not be reached on her primary number.

## 2025-07-17 NOTE — PROGRESS NOTES
In-basket message received from Dr. Lawler to add patient to the thoracic MDCC on 7/21/2025. Chart reviewed and prep completed.

## 2025-07-17 NOTE — PROGRESS NOTES
Patient states she had Nuclear Medicine study at [place] last year around April and had a reaction from testing. She noted burning sensation all through her body, feeling like she was on fire after receiving NM injection.  States she got an injection went to eat came back a few hours later. Then had imaging done.  Advised I will look into see what she had done but it was not a  PET CT.     Called patient back advised this sounds like a Bone scan. I will look into further. Reviewed again what PET CT. I will let Dr. Oconnor know that she had reaction to previous study.     Called [facility] spoke Sara - Radiology patient had Bone Scan on 04/24/24 at [facility].      Patient is aware she a bone scan done last year.     Routing to Thoracic Surgery to make them aware.

## 2025-07-21 ENCOUNTER — DOCUMENTATION (OUTPATIENT)
Dept: HEMATOLOGY ONCOLOGY | Facility: CLINIC | Age: 80
End: 2025-07-21

## 2025-07-21 NOTE — PROGRESS NOTES
THORACIC ONCOLOGY [facility] NOTE      NCCN guidelines were readily available for review at this discussion    DATE:  7/21/2025    PRESENTING DOCTOR: Dr. Oconnor    DIAGNOSIS:  SCLC  STAGING: TBD    REASON FOR DISCUSSION: Review for Treatment Planning    Adela Isidro is a 80 y.o. female who was presented at the Thoracic Oncology Multidisciplinary Cancer Conference today. She has a past medial history of HTN, obesity, GERD, former smoker. She presents to Thoracic Surgery with left upper lobe obstruction with segmental atelectasis. Also associated with mediastinal lymphadenopathy. On 07/11/25 she underwent EBUS, pathology positive for small cell carcinoma.           Imaging/Studies reviewed:   [] PET CT  [x] CT chest - 06/18/25  [] CT chest abdomen pelvis  [] CT lung screening program  [] MRI brain   [] Bone Scan  []PFTs           Pathology: Slides reviewed  07/11/25    Lung, Left Upper Lobe Mass, Biopsy:  - Small cell carcinoma.        Lymph Node, 11L, Fine needle aspiration (ThinPrep, smear and cell block preparations):  Positive for malignancy.  Metastatic small cell carcinoma.  NO IMMUNOPHENOTYPIC EVIDENCE FOR NON-HODGKIN LYMPHOMA DETECTED (Wevod#[id number] / [id number], evaluated by Jim Saravia M.D.).  Satisfactory for evaluation.     Lymph Node, 4R, Fine needle aspiration (ThinPrep, smear and cell block preparations):  Positive for malignancy.  Metastatic small cell carcinoma.  No flow immunophenotypic evidence of a lymphoproliferative disorder (Wevod#[id number] / [id number], evaluated by Alexia Felton MD PhD).  Satisfactory for evaluation.        PHYSICIAN RECOMMENDED PLAN:  PET CT  MRI brain  Referral to medical oncology        All specialty physicians & supportive services available.  Providers contributing to final recommendations noted below.      Thoracic: [x]    Pulmonology []    HemOnc: []    RadOnc: []    Neurosurg: []    Clinical Trials: Patient reviewed and not a candidate at this time  for a clinical trial at [facility]         Future Appointments   Date Time Provider Department Center   7/22/2025 11:00 AM Hien Poole MD HEM ONC [facility]   7/24/2025  8:00 AM [facility] PET 1 SH PET  HOSPITAL   8/13/2025  5:45 PM [facility] MRI 1 [facility] MRI  [facility]OSPITAL      Team agreed to plan.The final treatment plan will be left to the discretion of the patient and the treating physician.     DISCLAIMERS:  TO THE TREATING PHYSICIAN:  This conference is a meeting of clinicians from various specialty areas who evaluate and discuss patients for whom a multidisciplinary treatment approach is being considered. Please note that the above opinion was a consensus of the conference attendees and is intended only to assist in quality care of the discussed patient.  The responsibility for follow up on the input given during the conference, along with any final decisions regarding plan of care, is that of the patient and the patient's provider. Accordingly, appointments have only been recommended based on this information and have NOT been scheduled unless otherwise noted.      TO THE PATIENT:  This summary is a brief record of major aspects of your cancer treatment. You may choose to share a copy with any of your doctors or nurses. However, this is not a detailed or comprehensive record of your care.

## 2025-07-22 ENCOUNTER — APPOINTMENT (OUTPATIENT)
Dept: LAB | Facility: HOSPITAL | Age: 80
End: 2025-07-22
Payer: MEDICARE

## 2025-07-22 ENCOUNTER — OFFICE VISIT (OUTPATIENT)
Age: 80
End: 2025-07-22
Attending: SURGERY
Payer: MEDICARE

## 2025-07-22 VITALS
TEMPERATURE: 97.6 F | WEIGHT: 197 LBS | OXYGEN SATURATION: 95 % | HEART RATE: 84 BPM | HEIGHT: 60 IN | RESPIRATION RATE: 18 BRPM | DIASTOLIC BLOOD PRESSURE: 66 MMHG | SYSTOLIC BLOOD PRESSURE: 102 MMHG | BODY MASS INDEX: 38.68 KG/M2

## 2025-07-22 DIAGNOSIS — C34.12 SMALL CELL CARCINOMA OF UPPER LOBE OF LEFT LUNG (HCC): ICD-10-CM

## 2025-07-22 DIAGNOSIS — C34.12 SMALL CELL CARCINOMA OF UPPER LOBE OF LEFT LUNG (HCC): Primary | ICD-10-CM

## 2025-07-22 DIAGNOSIS — E66.01 MORBID (SEVERE) OBESITY DUE TO EXCESS CALORIES (HCC): ICD-10-CM

## 2025-07-22 LAB
BASOPHILS # BLD AUTO: 0.08 THOUSANDS/ÂΜL (ref 0–0.1)
BASOPHILS NFR BLD AUTO: 1 % (ref 0–1)
EOSINOPHIL # BLD AUTO: 0.18 THOUSAND/ÂΜL (ref 0–0.61)
EOSINOPHIL NFR BLD AUTO: 2 % (ref 0–6)
ERYTHROCYTE [DISTWIDTH] IN BLOOD BY AUTOMATED COUNT: 12.9 % (ref 11.6–15.1)
HCT VFR BLD AUTO: 42.7 % (ref 34.8–46.1)
HGB BLD-MCNC: 14.1 G/DL (ref 11.5–15.4)
IMM GRANULOCYTES # BLD AUTO: 0.03 THOUSAND/UL (ref 0–0.2)
IMM GRANULOCYTES NFR BLD AUTO: 0 % (ref 0–2)
LYMPHOCYTES # BLD AUTO: 2.36 THOUSANDS/ÂΜL (ref 0.6–4.47)
LYMPHOCYTES NFR BLD AUTO: 24 % (ref 14–44)
MCH RBC QN AUTO: 29.9 PG (ref 26.8–34.3)
MCHC RBC AUTO-ENTMCNC: 33 G/DL (ref 31.4–37.4)
MCV RBC AUTO: 91 FL (ref 82–98)
MONOCYTES # BLD AUTO: 0.79 THOUSAND/ÂΜL (ref 0.17–1.22)
MONOCYTES NFR BLD AUTO: 8 % (ref 4–12)
NEUTROPHILS # BLD AUTO: 6.57 THOUSANDS/ÂΜL (ref 1.85–7.62)
NEUTS SEG NFR BLD AUTO: 65 % (ref 43–75)
NRBC BLD AUTO-RTO: 0 /100 WBCS
PLATELET # BLD AUTO: 267 THOUSANDS/UL (ref 149–390)
PMV BLD AUTO: 10.3 FL (ref 8.9–12.7)
RBC # BLD AUTO: 4.71 MILLION/UL (ref 3.81–5.12)
WBC # BLD AUTO: 10.01 THOUSAND/UL (ref 4.31–10.16)

## 2025-07-22 PROCEDURE — G2211 COMPLEX E/M VISIT ADD ON: HCPCS | Performed by: INTERNAL MEDICINE

## 2025-07-22 PROCEDURE — 85025 COMPLETE CBC W/AUTO DIFF WBC: CPT

## 2025-07-22 PROCEDURE — 99205 OFFICE O/P NEW HI 60 MIN: CPT | Performed by: INTERNAL MEDICINE

## 2025-07-22 PROCEDURE — 36415 COLL VENOUS BLD VENIPUNCTURE: CPT

## 2025-07-22 NOTE — PROGRESS NOTES
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     I recommend waiting for the PET-CT which is scheduled in the next 2 days to help establish a stage. We will call with the results for this. We discussed the need for chemotherapy and immunotherapy (sequence depending on LS- versus ES-).     If this is limited stage, plan is for chemoradiation with carboplatin and etoposide, followed by durvalumabx 2 years. If this is extensive stage, plan will be for carboplatin,etoposide and atezolizumab    The potential toxicities of immunotherapy and chemotherapy were reviewed with the patient, which include but are not limited to: rash, fatigue, renal/liver/cardiac dysfunction, neuropathy, allergic reaction, hair loss/thinning, hearing loss, tinnitus, low blood counts, risk of infections, bleeding, any immune mediated reactions including pneumonitis, colitis, abnormal thyroid function, need for transfusions and even potentially life threatening side effects.    Literature on the drugs were  provided   She will have baseline labs done today.     We also dicussed PORT placement given poor access.     Orders:    Ambulatory Referral to Hematology / Oncology    CBC and differential; Future    Comprehensive metabolic panel; Future    Morbid (severe) obesity due to excess calories (HCC)  Will monitor weight       Return in about 23 days (around 2025).    History of Present Illness   Chief Complaint   Patient presents with    New Patient Visit     History of Present Illness  This is a 80-year-old lady who is being referred for a new diagnosis  of small cell lung cancer.  Past medical history significant for lipidemia, morbid obesity, subclinical hypothyroidism, COPD, GERD, 15-pack year h/o smoking (quit about 10 years back), osteoarthritis and hypertension.    She presented to her primary care in May 2025 with complaints of cough.  She has a prior history of smoking, but has not smoked in the past 3 years.  The cough started back in Sept 2024.  She was previously treated for the cough earlier this year, but there had been no improvement.  This subsequently led to further imaging.    She had a CT chest without contrast on June 19, 2025 at [facility].  This showed complete left upper lobe atelectasis with obstruction of the proximal left upper lobe bronchus.  There are abnormally enlarged mediastinal lymphadenopathy and trace left pleural effusion.    She was seen by Dr. Oconnor on June 25, 2025 and she underwent a bronchoscopy/EBUS on July 11, 2025.  Biopsy from the left upper lobe mass, 11L, 4R lymph node stations were consistent with small cell carcinoma.  CARIS sent and is pending    Her case was presented at the multidisciplinary thoracic tumor board on July 21, 2025, and recommendation was for a PET/CT, brain MRI to complete staging.  She has now been referred to medical oncology for further evaluation and treatment.    PET/CT has been scheduled for July 24 and a brain MRI is scheduled for August 13.    Patient lives alone. She has 2 sons who live in [place] and [place]. Smoking hx as noted above. H/o asbestos exposure. Family hx significant for son with multiple cancers starting in childhood.            Review of Systems   Constitutional:  Negative for appetite change, fatigue and unexpected weight change.   Respiratory:  Positive for cough and wheezing.         Yellowish sputum   Gastrointestinal:  Negative for abdominal pain.   Musculoskeletal:  Positive for back pain (mid back pain).   Neurological:  Negative for dizziness, seizures, weakness and  "headaches.   All other systems reviewed and are negative.    Medical History Reviewed by provider this encounter:     .      Objective   /66 (BP Location: Left arm, Patient Position: Sitting, Cuff Size: Large)   Pulse 84   Temp 97.6 °F (36.4 °C) (Temporal)   Resp 18   Ht 5' (1.524 m)   Wt 89.4 kg (197 lb)   SpO2 95%   BMI 38.47 kg/m²     Pain Screening:  Pain Score: 0-No pain  ECOG   0  Physical Exam  Vitals reviewed.   Constitutional:       Appearance: Normal appearance. She is obese.     Cardiovascular:      Rate and Rhythm: Normal rate and regular rhythm.      Heart sounds: No murmur heard.  Pulmonary:      Effort: Pulmonary effort is normal. No respiratory distress.      Breath sounds: Normal breath sounds. No wheezing.   Abdominal:      General: There is no distension.      Palpations: Abdomen is soft.     Musculoskeletal:      Right lower leg: No edema.      Left lower leg: No edema.     Neurological:      General: No focal deficit present.      Mental Status: She is alert and oriented to person, place, and time.     Labs: I have reviewed the following labs:  No results found for: \"WBC\", \"RBC\", \"HGB\", \"HCT\", \"MCV\", \"MCH\", \"RDW\", \"PLT\", \"NEUTOPHILPCT\", \"LYMPHOPCT\", \"MONOPCT\", \"EOSPCT\", \"BASOPCT\", \"IMMGRANS\", \"NEUTROABS\"  No results found for: \"NA\", \"K\", \"CL\", \"CO2\", \"ANIONGAP\", \"BUN\", \"CREATININE\", \"GLUCOSE\", \"GLUF\", \"CALCIUM\", \"CORRECTEDCA\", \"AST\", \"ALT\", \"ALKPHOS\", \"TP\", \"ALB\", \"TBILI\", \"EGFR\"  No recent labs    Radiology Results Review: I personally reviewed the following image studies in PACS and associated radiology reports: CT chest and CT abdomen/pelvis. My interpretation of the radiology images/reports is: as noted above.    Administrative Statements   I have spent a total time of 60 minutes in caring for this patient on the day of the visit/encounter including Diagnostic results, Prognosis, Risks and benefits of tx options, Patient and family education, Counseling / Coordination of care, " Documenting in the medical record, Reviewing/placing orders in the medical record (including tests, medications, and/or procedures), and Obtaining or reviewing history  .

## 2025-07-22 NOTE — PATIENT INSTRUCTIONS
You has small cell lung cancer.  We will need to wait for the PET/CT and brain MRI to establish the stage of your cancer, and to finalize treatment.  In general, small cell lung cancers or aggressive cancers.  If there is no spread of the cancer outside the lungs and surrounding lymph nodes, treatment will be with a combination of chemotherapy and radiation.  The chemotherapy used will be carboplatin and etoposide. The immunotherapy to be used is yet to be determined based on your stage.   Detail Level: Simple

## 2025-07-23 ENCOUNTER — TELEPHONE (OUTPATIENT)
Dept: INTERVENTIONAL RADIOLOGY/VASCULAR | Facility: HOSPITAL | Age: 80
End: 2025-07-23

## 2025-07-23 RX ORDER — SODIUM CHLORIDE 9 MG/ML
30 INJECTION, SOLUTION INTRAVENOUS CONTINUOUS
OUTPATIENT
Start: 2025-07-23

## 2025-07-23 RX ORDER — CEFAZOLIN SODIUM 2 G/50ML
2000 SOLUTION INTRAVENOUS ONCE
OUTPATIENT
Start: 2025-07-23 | End: 2025-07-23

## 2025-07-24 ENCOUNTER — HOSPITAL ENCOUNTER (OUTPATIENT)
Dept: NUCLEAR MEDICINE | Facility: HOSPITAL | Age: 80
End: 2025-07-24
Attending: SURGERY
Payer: MEDICARE

## 2025-07-24 DIAGNOSIS — C34.12 SMALL CELL CARCINOMA OF UPPER LOBE OF LEFT LUNG (HCC): ICD-10-CM

## 2025-07-24 LAB — GLUCOSE SERPL-MCNC: 121 MG/DL (ref 65–140)

## 2025-07-24 PROCEDURE — 78815 PET IMAGE W/CT SKULL-THIGH: CPT

## 2025-07-24 PROCEDURE — 82948 REAGENT STRIP/BLOOD GLUCOSE: CPT

## 2025-07-24 PROCEDURE — A9552 F18 FDG: HCPCS

## 2025-07-27 LAB
CARIS GENOMIC LOH - EXOME: NORMAL
CARIS HLA-A: NORMAL
CARIS HLA-B: NORMAL
CARIS HLA-C: NORMAL
CARIS MSI - EXOME: NORMAL
CARIS TMB - EXOME: NORMAL

## 2025-07-28 ENCOUNTER — TELEPHONE (OUTPATIENT)
Age: 80
End: 2025-07-28

## 2025-07-28 ENCOUNTER — PATIENT OUTREACH (OUTPATIENT)
Dept: HEMATOLOGY ONCOLOGY | Facility: CLINIC | Age: 80
End: 2025-07-28

## 2025-07-29 ENCOUNTER — TELEPHONE (OUTPATIENT)
Age: 80
End: 2025-07-29

## 2025-07-29 ENCOUNTER — TELEPHONE (OUTPATIENT)
Dept: INTERVENTIONAL RADIOLOGY/VASCULAR | Facility: HOSPITAL | Age: 80
End: 2025-07-29

## 2025-07-29 ENCOUNTER — DOCUMENTATION (OUTPATIENT)
Dept: HEMATOLOGY ONCOLOGY | Facility: CLINIC | Age: 80
End: 2025-07-29

## 2025-07-29 ENCOUNTER — HOSPITAL ENCOUNTER (OUTPATIENT)
Dept: INTERVENTIONAL RADIOLOGY/VASCULAR | Facility: HOSPITAL | Age: 80
Discharge: HOME | End: 2025-07-29
Attending: INTERNAL MEDICINE
Payer: MEDICARE

## 2025-07-29 VITALS
DIASTOLIC BLOOD PRESSURE: 58 MMHG | HEART RATE: 82 BPM | HEIGHT: 60 IN | RESPIRATION RATE: 16 BRPM | SYSTOLIC BLOOD PRESSURE: 119 MMHG | OXYGEN SATURATION: 93 % | WEIGHT: 197 LBS | BODY MASS INDEX: 38.68 KG/M2 | TEMPERATURE: 97.9 F

## 2025-07-29 DIAGNOSIS — C34.02 SMALL CELL CARCINOMA OF HILUM OF LEFT LUNG (HCC): Primary | ICD-10-CM

## 2025-07-29 DIAGNOSIS — C34.12 SMALL CELL CARCINOMA OF UPPER LOBE OF LEFT LUNG (HCC): ICD-10-CM

## 2025-07-29 PROBLEM — C34.92 SMALL CELL CARCINOMA OF LEFT LUNG (HCC): Status: ACTIVE | Noted: 2025-07-29

## 2025-07-29 PROCEDURE — 99152 MOD SED SAME PHYS/QHP 5/>YRS: CPT

## 2025-07-29 PROCEDURE — C1894 INTRO/SHEATH, NON-LASER: HCPCS

## 2025-07-29 PROCEDURE — 36561 INSERT TUNNELED CV CATH: CPT

## 2025-07-29 PROCEDURE — 76937 US GUIDE VASCULAR ACCESS: CPT

## 2025-07-29 PROCEDURE — C1788 PORT, INDWELLING, IMP: HCPCS

## 2025-07-29 PROCEDURE — 99153 MOD SED SAME PHYS/QHP EA: CPT

## 2025-07-29 RX ORDER — CEFAZOLIN SODIUM 2 G/50ML
2000 SOLUTION INTRAVENOUS ONCE
Status: COMPLETED | OUTPATIENT
Start: 2025-07-29 | End: 2025-07-29

## 2025-07-29 RX ORDER — SODIUM CHLORIDE 9 MG/ML
30 INJECTION, SOLUTION INTRAVENOUS CONTINUOUS
Status: DISCONTINUED | OUTPATIENT
Start: 2025-07-29 | End: 2025-07-30 | Stop reason: HOSPADM

## 2025-07-29 RX ORDER — FENTANYL CITRATE 50 UG/ML
INJECTION, SOLUTION INTRAMUSCULAR; INTRAVENOUS AS NEEDED
Status: COMPLETED | OUTPATIENT
Start: 2025-07-29 | End: 2025-07-29

## 2025-07-29 RX ORDER — MIDAZOLAM HYDROCHLORIDE 2 MG/2ML
INJECTION, SOLUTION INTRAMUSCULAR; INTRAVENOUS AS NEEDED
Status: COMPLETED | OUTPATIENT
Start: 2025-07-29 | End: 2025-07-29

## 2025-07-29 RX ORDER — SODIUM CHLORIDE 9 MG/ML
20 INJECTION, SOLUTION INTRAVENOUS ONCE
OUTPATIENT
Start: 2025-08-18

## 2025-07-29 RX ORDER — SODIUM CHLORIDE 9 MG/ML
20 INJECTION, SOLUTION INTRAVENOUS ONCE
OUTPATIENT
Start: 2025-08-19

## 2025-07-29 RX ORDER — LIDOCAINE AND PRILOCAINE 25; 25 MG/G; MG/G
CREAM TOPICAL AS NEEDED
Qty: 30 G | Refills: 1 | Status: SHIPPED | OUTPATIENT
Start: 2025-07-29 | End: 2025-07-30 | Stop reason: SDUPTHER

## 2025-07-29 RX ORDER — PROCHLORPERAZINE MALEATE 10 MG
10 TABLET ORAL EVERY 6 HOURS PRN
Qty: 30 TABLET | Refills: 0 | Status: SHIPPED | OUTPATIENT
Start: 2025-07-29 | End: 2025-07-30 | Stop reason: SDUPTHER

## 2025-07-29 RX ORDER — ONDANSETRON 8 MG/1
8 TABLET, FILM COATED ORAL EVERY 8 HOURS PRN
Qty: 60 TABLET | Refills: 0 | Status: SHIPPED | OUTPATIENT
Start: 2025-07-29 | End: 2025-07-30 | Stop reason: SDUPTHER

## 2025-07-29 RX ORDER — SODIUM CHLORIDE 9 MG/ML
20 INJECTION, SOLUTION INTRAVENOUS ONCE
OUTPATIENT
Start: 2025-08-20

## 2025-07-29 RX ADMIN — CEFAZOLIN SODIUM 2000 MG: 2 SOLUTION INTRAVENOUS at 11:31

## 2025-07-29 RX ADMIN — FENTANYL CITRATE 50 MCG: 50 INJECTION, SOLUTION INTRAMUSCULAR; INTRAVENOUS at 11:37

## 2025-07-29 RX ADMIN — MIDAZOLAM 1 MG: 1 INJECTION INTRAMUSCULAR; INTRAVENOUS at 11:37

## 2025-07-29 RX ADMIN — FENTANYL CITRATE 25 MCG: 50 INJECTION, SOLUTION INTRAMUSCULAR; INTRAVENOUS at 11:45

## 2025-07-29 RX ADMIN — MIDAZOLAM 0.5 MG: 1 INJECTION INTRAMUSCULAR; INTRAVENOUS at 11:48

## 2025-07-29 RX ADMIN — FENTANYL CITRATE 25 MCG: 50 INJECTION, SOLUTION INTRAMUSCULAR; INTRAVENOUS at 11:48

## 2025-07-29 RX ADMIN — MIDAZOLAM 0.5 MG: 1 INJECTION INTRAMUSCULAR; INTRAVENOUS at 11:45

## 2025-07-30 ENCOUNTER — TELEPHONE (OUTPATIENT)
Age: 80
End: 2025-07-30

## 2025-07-30 DIAGNOSIS — C34.02 SMALL CELL CARCINOMA OF HILUM OF LEFT LUNG (HCC): ICD-10-CM

## 2025-07-31 RX ORDER — LIDOCAINE AND PRILOCAINE 25; 25 MG/G; MG/G
CREAM TOPICAL AS NEEDED
Qty: 30 G | Refills: 1 | Status: SHIPPED | OUTPATIENT
Start: 2025-07-31

## 2025-07-31 RX ORDER — PROCHLORPERAZINE MALEATE 10 MG
10 TABLET ORAL EVERY 6 HOURS PRN
Qty: 30 TABLET | Refills: 0 | Status: SHIPPED | OUTPATIENT
Start: 2025-07-31

## 2025-07-31 RX ORDER — ONDANSETRON 8 MG/1
8 TABLET, FILM COATED ORAL EVERY 8 HOURS PRN
Qty: 60 TABLET | Refills: 0 | Status: SHIPPED | OUTPATIENT
Start: 2025-07-31

## 2025-08-06 ENCOUNTER — CONSULT (OUTPATIENT)
Facility: HOSPITAL | Age: 80
End: 2025-08-06
Attending: INTERNAL MEDICINE
Payer: MEDICARE

## 2025-08-06 VITALS
RESPIRATION RATE: 18 BRPM | TEMPERATURE: 98 F | OXYGEN SATURATION: 97 % | HEART RATE: 82 BPM | WEIGHT: 197.4 LBS | SYSTOLIC BLOOD PRESSURE: 118 MMHG | BODY MASS INDEX: 38.55 KG/M2 | DIASTOLIC BLOOD PRESSURE: 72 MMHG

## 2025-08-06 DIAGNOSIS — C34.02 SMALL CELL CARCINOMA OF HILUM OF LEFT LUNG (HCC): Primary | ICD-10-CM

## 2025-08-06 PROCEDURE — 99211 OFF/OP EST MAY X REQ PHY/QHP: CPT | Performed by: RADIOLOGY

## 2025-08-06 PROCEDURE — 99204 OFFICE O/P NEW MOD 45 MIN: CPT | Performed by: RADIOLOGY

## 2025-08-11 ENCOUNTER — TELEPHONE (OUTPATIENT)
Age: 80
End: 2025-08-11

## 2025-08-13 ENCOUNTER — HOSPITAL ENCOUNTER (OUTPATIENT)
Dept: MRI IMAGING | Facility: HOSPITAL | Age: 80
Discharge: HOME | End: 2025-08-13
Attending: SURGERY

## 2025-08-13 DIAGNOSIS — C34.12 SMALL CELL CARCINOMA OF UPPER LOBE OF LEFT LUNG (HCC): ICD-10-CM

## 2025-08-14 ENCOUNTER — HOSPITAL ENCOUNTER (OUTPATIENT)
Dept: INFUSION CENTER | Facility: HOSPITAL | Age: 80
Discharge: HOME | End: 2025-08-14
Payer: MEDICARE

## 2025-08-14 DIAGNOSIS — C34.02 SMALL CELL CARCINOMA OF HILUM OF LEFT LUNG (HCC): Primary | ICD-10-CM

## 2025-08-14 LAB
ALBUMIN SERPL BCG-MCNC: 3.8 G/DL (ref 3.5–5)
ALP SERPL-CCNC: 79 U/L (ref 34–104)
ALT SERPL W P-5'-P-CCNC: 16 U/L (ref 7–52)
ANION GAP SERPL CALCULATED.3IONS-SCNC: 5 MMOL/L (ref 4–13)
AST SERPL W P-5'-P-CCNC: 24 U/L (ref 13–39)
BASOPHILS # BLD AUTO: 0.06 THOUSANDS/ÂΜL (ref 0–0.1)
BASOPHILS NFR BLD AUTO: 1 % (ref 0–1)
BILIRUB SERPL-MCNC: 0.61 MG/DL (ref 0.2–1)
BUN SERPL-MCNC: 18 MG/DL (ref 5–25)
CALCIUM SERPL-MCNC: 9.3 MG/DL (ref 8.4–10.2)
CHLORIDE SERPL-SCNC: 104 MMOL/L (ref 96–108)
CO2 SERPL-SCNC: 31 MMOL/L (ref 21–32)
CREAT SERPL-MCNC: 0.87 MG/DL (ref 0.6–1.3)
EOSINOPHIL # BLD AUTO: 0.2 THOUSAND/ÂΜL (ref 0–0.61)
EOSINOPHIL NFR BLD AUTO: 3 % (ref 0–6)
ERYTHROCYTE [DISTWIDTH] IN BLOOD BY AUTOMATED COUNT: 12.9 % (ref 11.6–15.1)
GFR SERPL CREATININE-BSD FRML MDRD: 63 ML/MIN/1.73SQ M
GLUCOSE SERPL-MCNC: 95 MG/DL (ref 65–140)
HCT VFR BLD AUTO: 41.6 % (ref 34.8–46.1)
HGB BLD-MCNC: 13.6 G/DL (ref 11.5–15.4)
IMM GRANULOCYTES # BLD AUTO: 0.04 THOUSAND/UL (ref 0–0.2)
IMM GRANULOCYTES NFR BLD AUTO: 1 % (ref 0–2)
LYMPHOCYTES # BLD AUTO: 2.54 THOUSANDS/ÂΜL (ref 0.6–4.47)
LYMPHOCYTES NFR BLD AUTO: 34 % (ref 14–44)
MCH RBC QN AUTO: 29.7 PG (ref 26.8–34.3)
MCHC RBC AUTO-ENTMCNC: 32.7 G/DL (ref 31.4–37.4)
MCV RBC AUTO: 91 FL (ref 82–98)
MONOCYTES # BLD AUTO: 0.58 THOUSAND/ÂΜL (ref 0.17–1.22)
MONOCYTES NFR BLD AUTO: 8 % (ref 4–12)
NEUTROPHILS # BLD AUTO: 4.15 THOUSANDS/ÂΜL (ref 1.85–7.62)
NEUTS SEG NFR BLD AUTO: 53 % (ref 43–75)
NRBC BLD AUTO-RTO: 0 /100 WBCS
PLATELET # BLD AUTO: 262 THOUSANDS/UL (ref 149–390)
PMV BLD AUTO: 9.7 FL (ref 8.9–12.7)
POTASSIUM SERPL-SCNC: 3.8 MMOL/L (ref 3.5–5.3)
PROT SERPL-MCNC: 6.6 G/DL (ref 6.4–8.4)
RBC # BLD AUTO: 4.58 MILLION/UL (ref 3.81–5.12)
SODIUM SERPL-SCNC: 140 MMOL/L (ref 135–147)
WBC # BLD AUTO: 7.57 THOUSAND/UL (ref 4.31–10.16)

## 2025-08-14 PROCEDURE — 85025 COMPLETE CBC W/AUTO DIFF WBC: CPT | Performed by: INTERNAL MEDICINE

## 2025-08-14 PROCEDURE — 80053 COMPREHEN METABOLIC PANEL: CPT | Performed by: INTERNAL MEDICINE

## 2025-08-18 ENCOUNTER — HOSPITAL ENCOUNTER (OUTPATIENT)
Dept: INFUSION CENTER | Facility: HOSPITAL | Age: 80
Discharge: HOME | End: 2025-08-18
Attending: INTERNAL MEDICINE
Payer: MEDICARE

## 2025-08-18 VITALS
HEART RATE: 83 BPM | TEMPERATURE: 96.8 F | OXYGEN SATURATION: 95 % | WEIGHT: 198.19 LBS | HEIGHT: 60 IN | BODY MASS INDEX: 38.91 KG/M2 | SYSTOLIC BLOOD PRESSURE: 126 MMHG | RESPIRATION RATE: 18 BRPM | DIASTOLIC BLOOD PRESSURE: 60 MMHG

## 2025-08-18 DIAGNOSIS — C34.02 SMALL CELL CARCINOMA OF HILUM OF LEFT LUNG (HCC): Primary | ICD-10-CM

## 2025-08-18 PROCEDURE — 96413 CHEMO IV INFUSION 1 HR: CPT

## 2025-08-18 PROCEDURE — 96417 CHEMO IV INFUS EACH ADDL SEQ: CPT

## 2025-08-18 PROCEDURE — 96367 TX/PROPH/DG ADDL SEQ IV INF: CPT

## 2025-08-18 RX ORDER — SODIUM CHLORIDE 9 MG/ML
20 INJECTION, SOLUTION INTRAVENOUS ONCE
Status: COMPLETED | OUTPATIENT
Start: 2025-08-18 | End: 2025-08-18

## 2025-08-18 RX ADMIN — DEXAMETHASONE SODIUM PHOSPHATE: 10 INJECTION, SOLUTION INTRAMUSCULAR; INTRAVENOUS at 10:40

## 2025-08-18 RX ADMIN — SODIUM CHLORIDE 20 ML/HR: 9 INJECTION, SOLUTION INTRAVENOUS at 10:40

## 2025-08-18 RX ADMIN — FOSAPREPITANT 150 MG: 150 INJECTION, POWDER, LYOPHILIZED, FOR SOLUTION INTRAVENOUS at 11:24

## 2025-08-18 RX ADMIN — SODIUM CHLORIDE 148.8 MG: 0.9 INJECTION, SOLUTION INTRAVENOUS at 12:50

## 2025-08-18 RX ADMIN — CARBOPLATIN 386 MG: 10 INJECTION, SOLUTION INTRAVENOUS at 12:02

## 2025-08-19 ENCOUNTER — HOSPITAL ENCOUNTER (OUTPATIENT)
Dept: MRI IMAGING | Facility: HOSPITAL | Age: 80
Discharge: HOME | End: 2025-08-19
Attending: SURGERY
Payer: MEDICARE

## 2025-08-19 ENCOUNTER — HOSPITAL ENCOUNTER (OUTPATIENT)
Dept: INFUSION CENTER | Facility: HOSPITAL | Age: 80
Discharge: HOME | End: 2025-08-19
Attending: INTERNAL MEDICINE
Payer: MEDICARE

## 2025-08-19 VITALS
HEART RATE: 65 BPM | SYSTOLIC BLOOD PRESSURE: 128 MMHG | DIASTOLIC BLOOD PRESSURE: 73 MMHG | TEMPERATURE: 97.3 F | BODY MASS INDEX: 39.56 KG/M2 | RESPIRATION RATE: 18 BRPM | OXYGEN SATURATION: 95 % | WEIGHT: 201.5 LBS | HEIGHT: 60 IN

## 2025-08-19 DIAGNOSIS — C34.02 SMALL CELL CARCINOMA OF HILUM OF LEFT LUNG (HCC): Primary | ICD-10-CM

## 2025-08-19 PROCEDURE — A9585 GADOBUTROL INJECTION: HCPCS | Performed by: SURGERY

## 2025-08-19 PROCEDURE — 70553 MRI BRAIN STEM W/O & W/DYE: CPT

## 2025-08-19 RX ORDER — SODIUM CHLORIDE 9 MG/ML
20 INJECTION, SOLUTION INTRAVENOUS ONCE
Status: COMPLETED | OUTPATIENT
Start: 2025-08-19 | End: 2025-08-19

## 2025-08-19 RX ORDER — GADOBUTROL 604.72 MG/ML
8 INJECTION INTRAVENOUS
Status: COMPLETED | OUTPATIENT
Start: 2025-08-19 | End: 2025-08-19

## 2025-08-19 RX ADMIN — DEXAMETHASONE SODIUM PHOSPHATE: 10 INJECTION, SOLUTION INTRAMUSCULAR; INTRAVENOUS at 13:21

## 2025-08-19 RX ADMIN — SODIUM CHLORIDE 20 ML/HR: 0.9 INJECTION, SOLUTION INTRAVENOUS at 13:21

## 2025-08-19 RX ADMIN — GADOBUTROL 8 ML: 604.72 INJECTION INTRAVENOUS at 09:58

## 2025-08-19 RX ADMIN — ETOPOSIDE 148.8 MG: 20 INJECTION INTRAVENOUS at 13:50

## 2025-08-21 ENCOUNTER — OFFICE VISIT (OUTPATIENT)
Age: 80
End: 2025-08-21
Payer: MEDICARE

## 2025-08-21 VITALS
WEIGHT: 203 LBS | TEMPERATURE: 97.6 F | RESPIRATION RATE: 16 BRPM | HEIGHT: 60 IN | BODY MASS INDEX: 39.85 KG/M2 | DIASTOLIC BLOOD PRESSURE: 74 MMHG | SYSTOLIC BLOOD PRESSURE: 150 MMHG | OXYGEN SATURATION: 96 % | HEART RATE: 59 BPM

## 2025-08-21 DIAGNOSIS — C34.02 SMALL CELL CARCINOMA OF HILUM OF LEFT LUNG (HCC): Primary | ICD-10-CM

## 2025-08-21 PROCEDURE — G2211 COMPLEX E/M VISIT ADD ON: HCPCS | Performed by: INTERNAL MEDICINE

## 2025-08-21 PROCEDURE — 99215 OFFICE O/P EST HI 40 MIN: CPT | Performed by: INTERNAL MEDICINE

## (undated) DEVICE — Device

## (undated) DEVICE — SPECIMEN CONTAINER STERILE PEEL PACK

## (undated) DEVICE — MARKER UTILITY W/LABEL

## (undated) DEVICE — SPONGE GAUZE 4 X 4 16 PLY STRL PLASTIC TRAY LF